# Patient Record
Sex: MALE | Race: WHITE | NOT HISPANIC OR LATINO | Employment: OTHER | URBAN - METROPOLITAN AREA
[De-identification: names, ages, dates, MRNs, and addresses within clinical notes are randomized per-mention and may not be internally consistent; named-entity substitution may affect disease eponyms.]

---

## 2021-03-17 ENCOUNTER — EVALUATION (OUTPATIENT)
Dept: PHYSICAL THERAPY | Facility: REHABILITATION | Age: 66
End: 2021-03-17
Payer: COMMERCIAL

## 2021-03-17 DIAGNOSIS — G20 PARKINSON DISEASE (HCC): Primary | ICD-10-CM

## 2021-03-17 PROCEDURE — 97112 NEUROMUSCULAR REEDUCATION: CPT | Performed by: PHYSICAL THERAPIST

## 2021-03-17 PROCEDURE — 97162 PT EVAL MOD COMPLEX 30 MIN: CPT | Performed by: PHYSICAL THERAPIST

## 2021-03-17 NOTE — LETTER
2021    [address] [id number]    Patient: John Allen   YOB: 1955   Date of Visit: 3/17/2021     Encounter Diagnosis     ICD-10-CM    1  Parkinson disease [facility])  G20        Dear Dr Prema Avendaño: Thank you for your recent referral of John Allen  Please review the attached evaluation summary from John's recent visit  Please verify that you agree with the plan of care by signing the attached order  If you have any questions or concerns, please do not hesitate to call  I sincerely appreciate the opportunity to share in the care of one of your patients and hope to have another opportunity to work with you in the near future  Sincerely,    Sheryas Hayes, PT      Referring Provider:      I certify that I have read the below Plan of Care and certify the need for these services furnished under this plan of treatment while under my care  [address] [id number]  Via Fax: [phone]          PT EVALUATION    Name: John Allen  Date: 21  : 1955  Referring Provider: Gertrudis Byrne  AUTHORIZATION:   Insurance: Payor: Gustavo Spicer REP / Plan: Rohit CALABRESE 1970 [address] Tavon Goldberg REP / Product Type: Medicare PPO /       Have you been experiencing any difficulty when eating or drinking (including coughing)? Yes, sometimes almost choked with fine lettuce of salad  Chews food quick  Are you having difficulty being understood when talking with others? Yes, sometimes wife notes he's too quiet  Are you having any difficulty with memory, thinking, or finding words during a conversation? Yes, sometimes difficulty getting words out        Do you have any changes with your fine motor skills or dexterity resulting in difficulties with buttoning, closing zipper, opening/closing containers, or your abilities to tie your shoes independently? Yes, difficulty with buttoning and shoes  Are you having difficulties managing your tremors in your Ues? Yes  Have you noticed a change in your handwriting? Yes    SUBJECTIVE:  HPI: Landon Smith is a 76 y o  male referred to outpatient physical therapy for the following diagnosis No diagnosis found  Patient reports he was diagnosed with Parkinson's Disease in 2017  He's had episodes of freezing, his balance isn't good at night, and he's tripped up the steps  He is also going to [employer] to work on his strength  His legs felt like a big strength when he was younger, now not as strong  Falls about once per two weeks, tripping mainly, tripping about 2x/day  Some of these imbalances happen with turning  Has lightheaded with initial standing, goes away in a few seconds  Some difficulty getting out of a chair  Sometimes gets stuck when moving, in small areas, sometimes coming around his bed, between the bed and wall  Or sometimes getting up from the corner while holding things in his hand  Some difficulty with speech, slurring and having difficulty with loudness of speech  Some difficulty with getting dressed, tying shoes, sometimes his fingers don't work right  Sometimes hard to reach feet, notes back tightness  Numbness and tingling in the hands, more in the left  Has hearing aides  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[employer], worked on [employer], forklift  Formerly did the [employer] Up and Go program, which was stopped due to community COVID  Patient goals: be more coordinated, have better mobility  No past medical history on file  No current outpatient medications on file  OBJECTIVE:l  Core Movement Tasks Description of task    Sitting Normal   Sitting to Standing Normal   Standing Normal   Walking Forward trunk, mild  Adequate toe clearance bilaterally    Variable amounts of left arm swing, sometimes none, sometimes almost even to the right (normal arm swing on right)  Static Balance    Romberg Normal   Pull Test Normal      Mobility Measures 3/17/21   Assistive device used? None   5 Time Sit to Stand  (17" chair, arms across chest)       10 6 seconds   3 Meter Timed  Up & Go 6 4 seconds    7 7 seconds cognitive    11 1 seconds manual / cognitive      4 time turn (360 degrees to right, left, right, left) 11 6 second    10 8 seconds 2nd trial     Walking speed Not tested   Functional Gait Assessment (see below) 27/30       6 Minute Walk Test (100 foot circular course) 1450 feet  Ending heart rate 96 beats/minute    One episode of pausing due to pain about the left greater than right lumbar region, notes history of bulging disc and injections  Patient-Reported Outcome Measure: Activities-Specific Balance Confidence Scale (ABC Scale) Not tested     Functional Gait Assessment  3/3 Gait level surface  3/3 Change in gait speed  2/3 Gait with horizontal head turns  3/3 Gait with vertical head turns  3/3 Gait and pivot turn  3/3 Step over obstacle  3/3 Gait with narrow base of support  3/3 Gait with eyes closed  2/3 Ambulating backwards  2/3 Steps  27/30 Total score (less than 22/30 indicates increased risk of fall)  Lino Del Toro Upper Extremity Function Right Left     Presence of Tremor Right Upper Extremity Left Upper Extremity    Absent Absent        Muscle Tone--Modified Jose Scale Right Left   Elbow flexors/extensors 0 0   Shoulder external rotators 0 0   Hamstrings 0 0   Gastrocnemius 0 0     Manual Muscle Tests Right Left   Hip flexion 5/5 4/5    Knee extension 5/5 5/5   Dorsiflexion 4/5 5/5     Lacked sensation about right S1/S2  ASSESSMENT:  Marco A Olivia is a 76year old male with history of Parkinson's Disease, recent history of multiple falls  He performed well in mobility tasks, but performance in the clinic doesn't always match how patient is moving at home    There was a cognitive and simultaneous cost to movement, and we can see how freezing can occur with simultaneous or distracting tasks  Recommend physical therapy to establish a robust home exercise program and improve balance/mobility  Further referral needed: Yes, Che Treadwell would benefit from occupational and speech therapy  SHORT-TERM GOALS: 1 month(s)  1  Patient reports at most one fall per month  2  Patient demonstrates 3 meter timed up and go with less than 50% increase in time ("cost") from combined cognitive/manual task (dialing portable phone)  3  Patient walks at least 1600 feet during 6 minute walk test     LONG-TERM GOALS: 3 month(s)  1  Patient reports significant decrease in mobility concerns  2  Patient establishes a home program incorporating at least 3 days of higher intensity exercise, plus two other days of moderate intensity exercise, per week  Precautions - orthostasis    Specialty Treatment Diary  3/17/21 NV    Home exercise program Walk with large amplitude arm swing  Sitting to standing with anterior weight shift     Walking/endurance  Overground walking and treadmill walking with focus on large amplitude arm movement, cognitive tasks        Static and dynamic balance  Boxing, focus on lateral/forwards/backwards movement    Open door with power stance    Walking and turning in small spaces    Strengthening      Stretching              PLAN OF CARE:  Patient will benefit from physical therapy 2 times per week for 1-3 months, tapering in frequency as indicated by progress and ability to complete program without therapist   Neuromuscular re-education, therapeutic exercises, and therapeutic activities as outlined in grids      Heike Medrano, PT  3/17/2021

## 2021-03-17 NOTE — PROGRESS NOTES
PT EVALUATION    Name: Shikha Henriquez  Date: 21  : 1955  Referring Provider: Ofelia Jeffers  AUTHORIZATION:   Insurance: Payor: Usman Queen REP / Plan: Beatrice Benjamin PPO [facility] REP / Product Type: Medicare PPO /       Have you been experiencing any difficulty when eating or drinking (including coughing)? Yes, sometimes almost choked with fine lettuce of salad  Chews food quick  Are you having difficulty being understood when talking with others? Yes, sometimes wife notes he's too quiet  Are you having any difficulty with memory, thinking, or finding words during a conversation? Yes, sometimes difficulty getting words out  Do you have any changes with your fine motor skills or dexterity resulting in difficulties with buttoning, closing zipper, opening/closing containers, or your abilities to tie your shoes independently? Yes, difficulty with buttoning and shoes  Are you having difficulties managing your tremors in your Ues? Yes  Have you noticed a change in your handwriting? Yes    SUBJECTIVE:  HPI: Shikha Henriquez is a 76 y o  male referred to outpatient physical therapy for the following diagnosis No diagnosis found  Patient reports he was diagnosed with Parkinson's Disease in 2017  He's had episodes of freezing, his balance isn't good at night, and he's tripped up the steps  He is also going to [employer] to work on his strength  His legs felt like a big strength when he was younger, now not as strong  Falls about once per two weeks, tripping mainly, tripping about 2x/day  Some of these imbalances happen with turning  Has lightheaded with initial standing, goes away in a few seconds  Some difficulty getting out of a chair  Sometimes gets stuck when moving, in small areas, sometimes coming around his bed, between the bed and wall    Or sometimes getting up from the corner while holding things in his hand  Some difficulty with speech, slurring and having difficulty with loudness of speech  Some difficulty with getting dressed, tying shoes, sometimes his fingers don't work right  Sometimes hard to reach feet, notes back tightness  Numbness and tingling in the hands, more in the left  Has hearing aides  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[employer], worked on [employer], [employer]  Formerly did the [employer] Up and Go program, which was stopped due to community COVID  Patient goals: be more coordinated, have better mobility  No past medical history on file  No current outpatient medications on file  OBJECTIVE:l  Core Movement Tasks Description of task    Sitting Normal   Sitting to Standing Normal   Standing Normal   Walking Forward trunk, mild  Adequate toe clearance bilaterally  Variable amounts of left arm swing, sometimes none, sometimes almost even to the right (normal arm swing on right)  Static Balance    Romberg Normal   Pull Test Normal      Mobility Measures 3/17/21   Assistive device used? None   5 Time Sit to Stand  (17" chair, arms across chest)       10 6 seconds   3 Meter Timed  Up & Go 6 4 seconds    7 7 seconds cognitive    11 1 seconds manual / cognitive      4 time turn (360 degrees to right, left, right, left) 11 6 second    10 8 seconds 2nd trial     Walking speed Not tested   Functional Gait Assessment (see below) 27/30       6 Minute Walk Test (100 foot circular course) 1450 feet  Ending heart rate 96 beats/minute    One episode of pausing due to pain about the left greater than right lumbar region, notes history of bulging disc and injections  Patient-Reported Outcome Measure:  Activities-Specific Balance Confidence Scale (ABC Scale) Not tested     Functional Gait Assessment  3/3 Gait level surface  3/3 Change in gait speed  2/3 Gait with horizontal head turns  3/3 Gait with vertical head turns  3/3 Gait and pivot turn  3/3 Step over obstacle  3/3 Gait with narrow base of support  3/3 Gait with eyes closed  2/3 Ambulating backwards  2/3 Steps  27/30 Total score (less than 22/30 indicates increased risk of fall)  David Fisher Upper Extremity Function Right Left     Presence of Tremor Right Upper Extremity Left Upper Extremity    Absent Absent        Muscle Tone--Modified Jose Scale Right Left   Elbow flexors/extensors 0 0   Shoulder external rotators 0 0   Hamstrings 0 0   Gastrocnemius 0 0     Manual Muscle Tests Right Left   Hip flexion 5/5 4/5    Knee extension 5/5 5/5   Dorsiflexion 4/5 5/5     Lacked sensation about right S1/S2  ASSESSMENT:  Waqas Smalls is a 76year old male with history of Parkinson's Disease, recent history of multiple falls  He performed well in mobility tasks, but performance in the clinic doesn't always match how patient is moving at home  There was a cognitive and simultaneous cost to movement, and we can see how freezing can occur with simultaneous or distracting tasks  Recommend physical therapy to establish a robust home exercise program and improve balance/mobility  Further referral needed: Yes, Waqas Smalls would benefit from occupational and speech therapy  SHORT-TERM GOALS: 1 month(s)  1  Patient reports at most one fall per month  2  Patient demonstrates 3 meter timed up and go with less than 50% increase in time ("cost") from combined cognitive/manual task (dialing portable phone)  3  Patient walks at least 1600 feet during 6 minute walk test     LONG-TERM GOALS: 3 month(s)  1  Patient reports significant decrease in mobility concerns  2  Patient establishes a home program incorporating at least 3 days of higher intensity exercise, plus two other days of moderate intensity exercise, per week        Precautions - orthostasis    Specialty Treatment Diary  3/17/21 NV    Home exercise program Walk with large amplitude arm swing  Sitting to standing with anterior weight shift     Walking/endurance  Overground walking and treadmill walking with focus on large amplitude arm movement, cognitive tasks        Static and dynamic balance  Boxing, focus on lateral/forwards/backwards movement    Open door with power stance    Walking and turning in small spaces    Strengthening      Stretching              PLAN OF CARE:  Patient will benefit from physical therapy 2 times per week for 1-3 months, tapering in frequency as indicated by progress and ability to complete program without therapist   Neuromuscular re-education, therapeutic exercises, and therapeutic activities as outlined in grids      Yonnyma Both, PT  3/17/2021

## 2021-03-19 ENCOUNTER — TRANSCRIBE ORDERS (OUTPATIENT)
Dept: PHYSICAL THERAPY | Facility: REHABILITATION | Age: 66
End: 2021-03-19

## 2021-03-19 DIAGNOSIS — G20 PARKINSON DISEASE (HCC): Primary | ICD-10-CM

## 2021-03-23 ENCOUNTER — OFFICE VISIT (OUTPATIENT)
Dept: PHYSICAL THERAPY | Facility: REHABILITATION | Age: 66
End: 2021-03-23
Payer: COMMERCIAL

## 2021-03-23 DIAGNOSIS — G20 PARKINSON DISEASE (HCC): Primary | ICD-10-CM

## 2021-03-23 PROCEDURE — 97112 NEUROMUSCULAR REEDUCATION: CPT | Performed by: PHYSICAL THERAPIST

## 2021-03-23 NOTE — PROGRESS NOTES
PHYSICAL THERAPY TREATMENT    Name: Rios Sapp  Date: 21  : 1955  Referring Provider: Sebastien Martin  AUTHORIZATION:   Insurance: Payor: Seble Dubose REP / Plan: Speedy Cordoba PPO  W Tavon Goldberg REP / Product Type: Medicare PPO /     SUBJECTIVE:  HPI: Rios Sapp is a 76 y o  male referred to outpatient physical therapy for the following diagnosis   Encounter Diagnosis   Name Primary?  Parkinson disease ([address] ) Yes        Does the SciFit at the gym, increases resistance every minute or so  Patient goals: be more coordinated, have better mobility  OBJECTIVE:        Precautions - orthostasis    Specialty Treatment Diary  3/17/21 3/23/21    Home exercise program Walk with large amplitude arm swing  Sitting to standing with anterior weight shift     Walking/endurance  Overground walking and treadmill walking with focus on large amplitude arm movement, cognitive tasks (conversation), about 20-25 sec per lap, 7 min      Static and dynamic balance  Stepping up 8" step, backwards down, stacking cones (concurrent manual task), 30 sec x  4 sets    In Solo Step, step up and down 8" step,   On balance beam  Over 12" obstacles  Through narrow passageway  5 min total    Open door, power stance, step in, back out, close, repeat, counting backwards from 100 by 7s, x 3 min    Walking and turning 180 deg, 2 min        Strengthening  SciFit intervals 1 min on 1 min off, 15/20 RPE target (achieved with level 4 resistance) 12 min total, 107 bpm max    Stretching                ASSESSMENT:  Ileana Russell is a 76year old male with history of Parkinson's Disease, recent history of multiple falls  Performed well, unable to produce festination today  Recommend that he practice turning in narrow spaces in hallway at home, can narrow hallway with use of chairs  Good understanding of higher intensity interval training  Further referral needed: Yes, Ileana Russell would benefit from occupational and speech therapy      SHORT-TERM GOALS: 1 month(s)  1  Patient reports at most one fall per month  2  Patient demonstrates 3 meter timed up and go with less than 50% increase in time ("cost") from combined cognitive/manual task (dialing portable phone)  3  Patient walks at least 1600 feet during 6 minute walk test     LONG-TERM GOALS: 3 month(s)  1  Patient reports significant decrease in mobility concerns  2  Patient establishes a home program incorporating at least 3 days of higher intensity exercise, plus two other days of moderate intensity exercise, per week  PLAN OF CARE:  Patient will benefit from physical therapy 2 times per week for 1-3 months, tapering in frequency as indicated by progress and ability to complete program without therapist   Neuromuscular re-education, therapeutic exercises, and therapeutic activities as outlined in darshan Ortiz, PT  3/23/2021

## 2021-03-25 ENCOUNTER — OFFICE VISIT (OUTPATIENT)
Dept: PHYSICAL THERAPY | Facility: REHABILITATION | Age: 66
End: 2021-03-25
Payer: COMMERCIAL

## 2021-03-25 DIAGNOSIS — G20 PARKINSON DISEASE (HCC): Primary | ICD-10-CM

## 2021-03-25 PROCEDURE — 97112 NEUROMUSCULAR REEDUCATION: CPT | Performed by: PHYSICAL THERAPIST

## 2021-03-25 NOTE — PROGRESS NOTES
PHYSICAL THERAPY TREATMENT    Name: Noemi Woodard  Date: 21  : 1955  Referring Provider: Roshni Mcginnis  AUTHORIZATION:   Insurance: Payor: Olivia Romeo REP / Plan: Luc Tyson PPO  W Tavon Goldberg REP / Product Type: Medicare PPO /     SUBJECTIVE:  HPI: Noemi Woodard is a 76 y o  male referred to outpatient physical therapy for the following diagnosis   No diagnosis found  Running a little late today, was doing some work on the car  Patient goals: be more coordinated, have better mobility  OBJECTIVE:        Precautions - orthostasis    Specialty Treatment Diary  3/25/21   Home exercise program    Walking/endurance Overground walking with focus on fast pace that produces large arm swing and step lengths, 5 min       Static and dynamic balance Stepping up 8" step, backwards down, stacking cones (concurrent manual task), 30 sec x 3 sets    In Solo Step, step up and down 8" step,   Weaving between obstacles  Stop on airex foam 3 sec eyes closed  Then all with tossing ball (except eyes closed)  8 min total  Then up and backwards down foam incline, 1 min x 2 (2nd set with pausing)  Then onto foam incline, eyes closed 5 sec, backwards off, 1 min x 2 sets    Static stance in SoloStep:  On foam, eyes closed, horizontal head movements, 1 min x 3  Then semi-tandem eyes closed on foam, 1 min x 2  Then AP weight shift, eyes closed on foam, 1 min    Walk while dialing phone, 2 min    Open door, sidestep, walk in, step backwards, close, repeat, concurrent cognitive task, 2 min       Strengthening    Stretching            ASSESSMENT:  Had reported some imbalance and stepping with static balance tasks, so practiced on foam and with eyes closed, then on foam incline  Practiced tasks with concurrent manual and/or cognitive cost, decreased cost today  Continue to push higher intensity activity  Further referral needed: Yes, Aly Vincent would benefit from occupational and speech therapy  SHORT-TERM GOALS: 1 month(s)  1  Patient reports at most one fall per month  2  Patient demonstrates 3 meter timed up and go with less than 50% increase in time ("cost") from combined cognitive/manual task (dialing portable phone)  3  Patient walks at least 1600 feet during 6 minute walk test     LONG-TERM GOALS: 3 month(s)  1  Patient reports significant decrease in mobility concerns  2  Patient establishes a home program incorporating at least 3 days of higher intensity exercise, plus two other days of moderate intensity exercise, per week  PLAN OF CARE:  Patient will benefit from physical therapy 2 times per week for 1-3 months, tapering in frequency as indicated by progress and ability to complete program without therapist   Neuromuscular re-education, therapeutic exercises, and therapeutic activities as outlined in darshan Bautista, PT  3/25/2021

## 2021-03-30 ENCOUNTER — APPOINTMENT (OUTPATIENT)
Dept: PHYSICAL THERAPY | Facility: REHABILITATION | Age: 66
End: 2021-03-30
Payer: COMMERCIAL

## 2021-04-01 ENCOUNTER — OFFICE VISIT (OUTPATIENT)
Dept: PHYSICAL THERAPY | Facility: REHABILITATION | Age: 66
End: 2021-04-01
Payer: COMMERCIAL

## 2021-04-01 DIAGNOSIS — G20 PARKINSON DISEASE (HCC): Primary | ICD-10-CM

## 2021-04-01 PROCEDURE — 97112 NEUROMUSCULAR REEDUCATION: CPT | Performed by: PHYSICAL THERAPIST

## 2021-04-01 NOTE — PROGRESS NOTES
PHYSICAL THERAPY TREATMENT    Name: Merline Fallon  Date: 21  : 1955  Referring Provider: Virgia Skiff  AUTHORIZATION:   Insurance: Payor: Margarette Waggoner REP / Plan: Jossue Canales PPO  W Tavon Goldberg REP / Product Type: Medicare PPO /     SUBJECTIVE:  HPI: Merline Fallon is a 76 y o  male referred to outpatient physical therapy for the following diagnosis   Encounter Diagnosis   Name Primary?  Parkinson disease ([address] ) Yes         Running a little late today, was doing some work on the car  Patient goals: be more coordinated, have better mobility  OBJECTIVE:        Precautions - orthostasis    Specialty Treatment Diary  3/25/21   Home exercise program    Walking/endurance Overground walking with focus on fast pace that produces large arm swing and step lengths, 5 min, about 20-24 sec laps       Static and dynamic balance In SoloStep:   Stepping up 8" step, backwards down, 30 sec x 3 sets  Then laterally 30 sec x 2    Stop on airex foam 3 sec eyes closed, back off, 2 min  Then up and backwards down foam incline, 1 min x 8 (pausing, passing ball)  Same laterally 4 min total  Then sidestep, with crossover and braiding stepping, 5 min         Strengthening    Stretching            ASSESSMENT:  Tadeo Long did very well with mobility today, very challenging to promote imbalance, even with concurrent tasks, backwards walking and stepping, and fast stopping  Continue to challenge  Continue to incorporate higher intensity exercise at home  Further referral needed: Yes, Tadeo Long would benefit from occupational and speech therapy  SHORT-TERM GOALS: 1 month(s)  1  Patient reports at most one fall per month  2  Patient demonstrates 3 meter timed up and go with less than 50% increase in time ("cost") from combined cognitive/manual task (dialing portable phone)  3  Patient walks at least 1600 feet during 6 minute walk test     LONG-TERM GOALS: 3 month(s)  1  Patient reports significant decrease in mobility concerns  2  Patient establishes a home program incorporating at least 3 days of higher intensity exercise, plus two other days of moderate intensity exercise, per week  PLAN OF CARE:  Patient will benefit from physical therapy 2 times per week for 1-3 months, tapering in frequency as indicated by progress and ability to complete program without therapist   Neuromuscular re-education, therapeutic exercises, and therapeutic activities as outlined in darshan Villagomez, PT  4/1/2021

## 2021-04-06 ENCOUNTER — OFFICE VISIT (OUTPATIENT)
Dept: PHYSICAL THERAPY | Facility: REHABILITATION | Age: 66
End: 2021-04-06
Payer: COMMERCIAL

## 2021-04-06 DIAGNOSIS — G20 PARKINSON DISEASE (HCC): Primary | ICD-10-CM

## 2021-04-06 PROCEDURE — 97112 NEUROMUSCULAR REEDUCATION: CPT | Performed by: PHYSICAL THERAPIST

## 2021-04-06 NOTE — PROGRESS NOTES
PHYSICAL THERAPY TREATMENT    Name: Viva Frankel  Date: 21  : 1955  Referring Provider: Perrin Prader  AUTHORIZATION:   Insurance: Payor: Carolyn Salas REP / Plan: Amon Goodpasture PPO [facility] REP / Product Type: Medicare PPO /     SUBJECTIVE:  HPI: Viva Frankel is a 76 y o  male referred to outpatient physical therapy for the following diagnosis   Encounter Diagnosis   Name Primary?  Parkinson disease ([address] ) Yes       Did a lot of work over the weekend, walking hills too  Will help friend stock pond with trout, did some trout fishing over the weekend  Patient goals: be more coordinated, have better mobility  OBJECTIVE:        Precautions - orthostasis    Specialty Treatment Diary  21   Home exercise program    Walking/endurance Overground walking with focus on fast pace that produces large arm swing and step lengths, stepping over 6" obstacles with one step per obstacle, 5 min           Static and dynamic balance In SoloStep, over [employer] treadmill, no use of hands:  1 9 mph baseline 3 min  Then up 8% incline, 1 min x 2  Then 2 4 mph 1 min x 1  Backwards 0 9 mph 1 min    Then 1 9 mph 2 min  Then while dialing phone, 1 min x 3  Then backwards 0 9 mph x 2, then dialing phone backwards 1 min  Then sidestep 0 4 mph 1 min each    Boxing working on cross body punching, backwards step in response to therapist movement, 30 sec on 15 sec rest x 6 sets       Strengthening    Stretching          ASSESSMENT:  Shared that he's had intermittent palpitations, had normal treadmill stress test a couple years ago  /73, 90 bpm upon rest   No symptoms with exertion  Continue to challenge multitasking and multidirection movement  Further referral needed: Yes, Deniz Keita would benefit from occupational and speech therapy  SHORT-TERM GOALS: 1 month(s)  1  Patient reports at most one fall per month    2  Patient demonstrates 3 meter timed up and go with less than 50% increase in time ("cost") from combined cognitive/manual task (dialing portable phone)  3  Patient walks at least 1600 feet during 6 minute walk test     LONG-TERM GOALS: 3 month(s)  1  Patient reports significant decrease in mobility concerns  2  Patient establishes a home program incorporating at least 3 days of higher intensity exercise, plus two other days of moderate intensity exercise, per week  PLAN OF CARE:  Patient will benefit from physical therapy 2 times per week for 1-3 months, tapering in frequency as indicated by progress and ability to complete program without therapist   Neuromuscular re-education, therapeutic exercises, and therapeutic activities as outlined in grids      Oz Rueda, PT  4/6/2021

## 2021-04-08 ENCOUNTER — OFFICE VISIT (OUTPATIENT)
Dept: PHYSICAL THERAPY | Facility: REHABILITATION | Age: 66
End: 2021-04-08
Payer: COMMERCIAL

## 2021-04-08 DIAGNOSIS — G20 PARKINSON DISEASE (HCC): Primary | ICD-10-CM

## 2021-04-08 PROCEDURE — 97112 NEUROMUSCULAR REEDUCATION: CPT | Performed by: PHYSICAL THERAPIST

## 2021-04-08 NOTE — PROGRESS NOTES
PHYSICAL THERAPY TREATMENT    Name: Lilibeth Rosa  Date: 21  : 1955  Referring Provider: Zeina Cohn  AUTHORIZATION:   Insurance: Payor: Ranjith Gonzalez REP / Plan: Oksana Munoz PPO [facility] REP / Product Type: Medicare PPO /     SUBJECTIVE:  HPI: Lilibeth Rosa is a 76 y o  male referred to outpatient physical therapy for the following diagnosis   Encounter Diagnosis   Name Primary?  Parkinson disease ([address] ) Yes       Stocking fish yesterday up by [facility]  Right hamstring tightness yesterday, not today  Episode of freezing last night, in basement, needed to turn or back up  Grabbed on to table, got his feet moving again  [place], felt his feet start to slide, had to get to hands and knees, pulled himself out that way  Happens 2-3x/week generally  Last week was in toolshed, something was in the way, froze with feet apart, able to back up and get out  Patient goals: be more coordinated, have better mobility  OBJECTIVE:        Precautions - orthostasis    Specialty Treatment Diary  21   Home exercise program    Walking/endurance Overground walking with focus on fast pace that produces large arm swing and step lengths, stepping over 6" obstacles with one step per obstacle, 5 min           Static and dynamic balance In SoloStep, over [employer] treadmill, no use of hands:  2 2 mph - 2 8 mph 5 min  Backwards 1 2 mph 2 min  Sideways 0 6-0 7 mph 2 min to right and 2 min to left  Then repeated while tossing and catching ball  2 2 mph - 2 8 mph 5 min  Backwards 1 2 mph 2 min  Sideways 0 6-0 7 mph 2 min to right and 2 min to left    Boxing working on cross body punching, backwards step in response to therapist movement, 30 sec on 15 sec rest x 6 sets       Strengthening    Stretching          ASSESSMENT:  Minimal decline with concurrent conversation or catching task  We reviewed situations where he'd frozen, besides change of direction, no pattern to this    Continue to challenge with higher intensity exercise  Further referral needed: Yes, Oliverio Lose would benefit from occupational and speech therapy  SHORT-TERM GOALS: 1 month(s)  1  Patient reports at most one fall per month  2  Patient demonstrates 3 meter timed up and go with less than 50% increase in time ("cost") from combined cognitive/manual task (dialing portable phone)  3  Patient walks at least 1600 feet during 6 minute walk test     LONG-TERM GOALS: 3 month(s)  1  Patient reports significant decrease in mobility concerns  2  Patient establishes a home program incorporating at least 3 days of higher intensity exercise, plus two other days of moderate intensity exercise, per week  PLAN OF CARE:  Patient will benefit from physical therapy 2 times per week for 1-3 months, tapering in frequency as indicated by progress and ability to complete program without therapist   Neuromuscular re-education, therapeutic exercises, and therapeutic activities as outlined in grids      Reji Pierce, PT  4/8/2021

## 2021-04-13 ENCOUNTER — APPOINTMENT (OUTPATIENT)
Dept: PHYSICAL THERAPY | Facility: REHABILITATION | Age: 66
End: 2021-04-13
Payer: COMMERCIAL

## 2021-04-15 ENCOUNTER — EVALUATION (OUTPATIENT)
Dept: PHYSICAL THERAPY | Facility: REHABILITATION | Age: 66
End: 2021-04-15
Payer: COMMERCIAL

## 2021-04-15 ENCOUNTER — TRANSCRIBE ORDERS (OUTPATIENT)
Dept: PHYSICAL THERAPY | Facility: REHABILITATION | Age: 66
End: 2021-04-15

## 2021-04-15 DIAGNOSIS — D68.52 HETEROZYGOUS FOR PROTHROMBIN G20210A MUTATION (HCC): Primary | ICD-10-CM

## 2021-04-15 DIAGNOSIS — G20 PARKINSON DISEASE (HCC): Primary | ICD-10-CM

## 2021-04-15 PROCEDURE — 97112 NEUROMUSCULAR REEDUCATION: CPT | Performed by: PHYSICAL THERAPIST

## 2021-04-15 NOTE — LETTER
April 15, 2021    [facility] [employer] [id number]    Patient: Ancel Schwab   YOB: 1955   Date of Visit: 4/15/2021     Encounter Diagnosis     ICD-10-CM    1  Parkinson disease [facility])  G20        Dear Dr Nolberto Morgan: Thank you for your recent referral of Ancel Schwab  Please review the attached evaluation summary from John's recent visit  Please verify that you agree with the plan of care by signing the attached order  If you have any questions or concerns, please do not hesitate to call  I sincerely appreciate the opportunity to share in the care of one of your patients and hope to have another opportunity to work with you in the near future  Sincerely,    Alexi Pedroza PT      Referring Provider:      I certify that I have read the below Plan of Care and certify the need for these services furnished under this plan of treatment while under my care  [facility] [employer] [id number]  Via Fax: [phone]          PHYSICAL THERAPY TREATMENT    Name: Ancel Schwab  Date: 04/15/21  : 1955  Referring Provider: Saint Sheng  AUTHORIZATION:   Insurance: Payor: Dewayne Swenson REP / Plan: Gavi CALABRESE [facility] REP / Product Type: Medicare PPO /     SUBJECTIVE:  HPI: Ancel Schwab is a 76 y o  male referred to outpatient physical therapy for the following diagnosis   Encounter Diagnosis   Name Primary?  Parkinson disease ([address] ) Yes       4/15/21:  Was getting fish out of the water and feet got stuck in [place], had difficulty lifting feet from the water  The other day slipped over a root  This week no freezing or loss of balance at home  Around 80% with mobility, main concern is balance  Problem of rigidity with initial moving in the morning  Going to gym regularly, working hard on recumbent stepper among     21  Stocking fish yesterday up by [facility]  Right hamstring tightness yesterday, not today  Episode of freezing last night, in basement, needed to turn or back up  Grabbed on to table, got his feet moving again  Also happened [place] the creek, felt his feet start to slide, had to get to hands and knees, pulled himself out that way  Happens 2-3x/week generally  Last week was in toolshed, something was in the way, froze with feet apart, able to back up and get out  Patient goals: be more coordinated, have better mobility  OBJECTIVE:        Precautions - orthostasis    Specialty Treatment Diary  4/15/21   Home exercise program    Walking/endurance    Static and dynamic balance Step ups as fast as possible:   8" step forwards 1 min x 2 sets  Laterally 8" step 1 min x 2   Strengthening    Stretching Standing hip flexor stretch 1 min x 2 each side             Mobility Measures 3/17/21 4/15/21   Assistive device used? None    5 Time Sit to Stand  (17" chair, arms across chest)       10 6 seconds 7 1 seconds   3 Meter Timed  Up & Go 6 4 seconds    7 7 seconds cognitive    11 1 seconds manual / cognitive    5 8 seconds    5 8 seconds cognitive     6 7 manual / cognitive   4 time turn (360 degrees to right, left, right, left) 11 6 second    10 8 seconds 2nd trial   9 6 seconds    8 7 seconds   Walking speed Not tested    Functional Gait Assessment (see below) 27/30 29/30        6 Minute Walk Test (100 foot circular course) 1450 feet  Ending heart rate 96 beats/minute    One episode of pausing due to pain about the left greater than right lumbar region, notes history of bulging disc and injections  2050 feet    Just started hurting above the left hip at minute l   Patient-Reported Outcome Measure:  Activities-Specific Balance Confidence Scale (ABC Scale) Not tested Not tested     Functional Gait Assessment  3/3 Gait level surface  3/3 Change in gait speed  3/3 Gait with horizontal head turns  3/3 Gait with vertical head turns  3/3 Gait and pivot turn  3/3 Step over obstacle  2/3 Gait with narrow base of support  3/3 Gait with eyes closed  3/3 Ambulating backwards  3/3 Steps  29/30 Total score (less than 22/30 indicates increased risk of fall)       ASSESSMENT:  Good improvement in mobility over the past month, especially in 6 minute walk test and Functional Gait Assessment  And good level of exertion outside of therapy  Based on good improvements and carryover, we will follow-up in 6 weeks  Further referral needed: Yes, Per Cortes would benefit from occupational and speech therapy  SHORT-TERM GOALS: 1 month(s)  1  Patient reports at most one fall per month  NOT MET but with some increased balance challenge situations  2  Patient demonstrates 3 meter timed up and go with less than 50% increase in time ("cost") from combined cognitive/manual task (dialing portable phone)  MET  3  Patient walks at least 1600 feet during 6 minute walk test   MET, SURPASSED  LONG-TERM GOALS: 3 month(s)  1  Patient reports significant decrease in mobility concerns  GOOD PROGRESS  2  Patient establishes a home program incorporating at least 3 days of higher intensity exercise, plus two other days of moderate intensity exercise, per week  MET  PLAN OF CARE:  Patient will benefit from physical therapy tapering in frequency as indicated by progress and ability to complete program without therapist   Neuromuscular re-education, therapeutic exercises, and therapeutic activities as outlined in grids      Janay [place], PT  4/15/2021

## 2021-04-15 NOTE — PROGRESS NOTES
PHYSICAL THERAPY TREATMENT    Name: Marcie Kaur  Date: 04/15/21  : 1955  Referring Provider: Yu Brandon  AUTHORIZATION:   Insurance: Payor: Vicky Johnson REP / Plan: Annie CALABRESE [address] REP / Product Type: Medicare PPO /     SUBJECTIVE:  HPI: Marcie Kaur is a 76 y o  male referred to outpatient physical therapy for the following diagnosis   Encounter Diagnosis   Name Primary?  Parkinson disease ([address] ) Yes       4/15/21:  Was getting fish out of the water and feet got stuck in [place], had difficulty lifting feet from the water  The other day slipped over a root  This week no freezing or loss of balance at home  Around 80% with mobility, main concern is balance  Problem of rigidity with initial moving in the morning  Going to gym regularly, working hard on recumbent stepper among     21  Stocking fish yesterday up by [facility]  Right hamstring tightness yesterday, not today  Episode of freezing last night, in basement, needed to turn or back up  Grabbed on to table, got his feet moving again  Also happened [place] the creek, felt his feet start to slide, had to get to hands and knees, pulled himself out that way  Happens 2-3x/week generally  Last week was in toolshed, something was in the way, froze with feet apart, able to back up and get out  Patient goals: be more coordinated, have better mobility  OBJECTIVE:        Precautions - orthostasis    Specialty Treatment Diary  4/15/21   Home exercise program    Walking/endurance    Static and dynamic balance Step ups as fast as possible:   8" step forwards 1 min x 2 sets  Laterally 8" step 1 min x 2   Strengthening    Stretching Standing hip flexor stretch 1 min x 2 each side             Mobility Measures 3/17/21 4/15/21   Assistive device used?  None    5 Time Sit to Stand  (17" chair, arms across chest)       10 6 seconds 7 1 seconds   3 Meter Timed  Up & Go 6 4 seconds    7 7 seconds cognitive    11 1 seconds manual / cognitive    5 8 seconds    5 8 seconds cognitive     6 7 manual / cognitive   4 time turn (360 degrees to right, left, right, left) 11 6 second    10 8 seconds 2nd trial   9 6 seconds    8 7 seconds   Walking speed Not tested    Functional Gait Assessment (see below) 27/30 29/30        6 Minute Walk Test (100 foot circular course) 1450 feet  Ending heart rate 96 beats/minute    One episode of pausing due to pain about the left greater than right lumbar region, notes history of bulging disc and injections  2050 feet    Just started hurting above the left hip at minute l   Patient-Reported Outcome Measure: Activities-Specific Balance Confidence Scale (ABC Scale) Not tested Not tested     Functional Gait Assessment  3/3 Gait level surface  3/3 Change in gait speed  3/3 Gait with horizontal head turns  3/3 Gait with vertical head turns  3/3 Gait and pivot turn  3/3 Step over obstacle  2/3 Gait with narrow base of support  3/3 Gait with eyes closed  3/3 Ambulating backwards  3/3 Steps  29/30 Total score (less than 22/30 indicates increased risk of fall)       ASSESSMENT:  Good improvement in mobility over the past month, especially in 6 minute walk test and Functional Gait Assessment  And good level of exertion outside of therapy  Based on good improvements and carryover, we will follow-up in 6 weeks  Further referral needed: Yes, Romario Forbes would benefit from occupational and speech therapy  SHORT-TERM GOALS: 1 month(s)  1  Patient reports at most one fall per month  NOT MET but with some increased balance challenge situations  2  Patient demonstrates 3 meter timed up and go with less than 50% increase in time ("cost") from combined cognitive/manual task (dialing portable phone)  MET  3  Patient walks at least 1600 feet during 6 minute walk test   MET, SURPASSED  LONG-TERM GOALS: 3 month(s)  1  Patient reports significant decrease in mobility concerns  GOOD PROGRESS  2   Patient establishes a home program incorporating at least 3 days of higher intensity exercise, plus two other days of moderate intensity exercise, per week  MET  PLAN OF CARE:  Patient will benefit from physical therapy tapering in frequency as indicated by progress and ability to complete program without therapist   Neuromuscular re-education, therapeutic exercises, and therapeutic activities as outlined in darshan Carranza, PT  4/15/2021

## 2021-06-02 ENCOUNTER — EVALUATION (OUTPATIENT)
Dept: PHYSICAL THERAPY | Facility: REHABILITATION | Age: 66
End: 2021-06-02
Payer: COMMERCIAL

## 2021-06-02 ENCOUNTER — TRANSCRIBE ORDERS (OUTPATIENT)
Dept: PHYSICAL THERAPY | Facility: REHABILITATION | Age: 66
End: 2021-06-02

## 2021-06-02 DIAGNOSIS — G20 PARKINSON DISEASE (HCC): Primary | ICD-10-CM

## 2021-06-02 PROCEDURE — 97112 NEUROMUSCULAR REEDUCATION: CPT | Performed by: PHYSICAL THERAPIST

## 2021-06-02 PROCEDURE — 97110 THERAPEUTIC EXERCISES: CPT | Performed by: PHYSICAL THERAPIST

## 2021-06-02 NOTE — PROGRESS NOTES
PHYSICAL THERAPY TREATMENT    Name: Mauricio Lindo  Date: 21  : 1955  Referring Provider: Maximo Lopes  AUTHORIZATION:   Insurance: Payor: Allison Humphrey REP / Plan: Virgen Miller PPO  W Tavon Goldberg REP / Product Type: Medicare PPO /     SUBJECTIVE:  HPI: Mauricio Lindo is a 78 y o  male referred to outpatient physical therapy for the following diagnosis   Encounter Diagnosis   Name Primary?  Parkinson disease ([address] ) Yes      21  Has been going to the gym to work on leg strength, does up to 3000 steps on the [employer], does leg lifts fir thighs and calves, knee extension, trunk twists, does hydromassage to get things loosened up  Tries to do this 4 times per week  Friend and himself fell into river when fishing  Was reaching forwards for a fish  No tripping falls  At home had some falls from tripping, catching foot on stairs when going up quickly  Wants to have back checked  Had steroid shot 4 years ago  Felt some back pain if doing a lot of bending over  Doing a lot of manual work around [employer] yard  Occurs across lumbar region, sometimes to the right hip socket  Patient goals: be more coordinated, have better mobility  OBJECTIVE:        Mobility Measures 3/17/21 4/15/21 6/02/21   Assistive device used?  None     5 Time Sit to Stand  (17" chair, arms across chest)       10 6 seconds 7 1 seconds 8 3 seconds   3 Meter Timed  Up & Go 6 4 seconds    7 7 seconds cognitive    11 1 seconds manual / cognitive    5 8 seconds    5 8 seconds cognitive     6 7 manual / cognitive 5 4 seconds     6 0 cognitive    8 3 manual/cognitive   4 time turn (360 degrees to right, left, right, left) 11 6 second    10 8 seconds 2nd trial   9 6 seconds    8 7 seconds 8 9 seconds    8 4 seconds   Walking speed Not tested     Functional Gait Assessment (see below)          6 Minute Walk Test (100 foot circular course) 1450 feet  Ending heart rate 96 beats/minute    One episode of pausing due to pain about the left greater than right lumbar region, notes history of bulging disc and injections  2050 feet    Just started hurting above the left hip at minute l 2060 feet       Patient-Reported Outcome Measure: Activities-Specific Balance Confidence Scale (ABC Scale) Not tested Not tested Not tested     Functional Gait Assessment  3/3 Gait level surface  3/3 Change in gait speed  2/3 Gait with horizontal head turns  3/3 Gait with vertical head turns  3/3 Gait and pivot turn  3/3 Step over obstacle  3/3 Gait with narrow base of support  3/3 Gait with eyes closed  3/3 Ambulating backwards  2/3 Steps  28/30 Total score (less than 22/30 indicates increased risk of fall)       Mild to moderate hip tightness with hamstrings, hip IR, hip ER, abduction  Tightness about lumbar region with 5/5 lower abdominal strength position, denies pain with hooklying bilateral bent knee raise  Added bilateral bent knee raise in hooklying 1 25 min to HEP  Also added supine hip flexor stretches, 1 min x 2 each  Scapular row, 100-115 lbs 12 x 2 sets  Standing place 11 lbs to stomach height and extend, keep trunk stable, 10 x 2 sets    ASSESSMENT:  Anusha Ward was able to maintain improvements in mobility over the past 1 5 months  He scores well with his objective measures of mobility  He does have some stiffness in the early morning and with medication wearing-off times  Recommend discussing with neurologist at next visit if there is any further refining of medication to keep mobility fluid throughout the day  On the movement side of things, Anusha Ward is maintaining a good level of physical activity and is doing great with his level of exercise  Due to report of back pain with prolonged standing/stooping/lifting tasks, we addressed this with a greater focus on lumbar stabilization, which will be incorporated in gym-based routine, and hip flexion stretching    He can follow-up with orthopedics or physical therapy if no improvement in a month or so  Discharge from physical therapy at this time with good understanding of physical activity and exercise  Resume if we see any decline  SHORT-TERM GOALS: 1 month(s)  1  Patient reports at most one fall per month  NOT MET   2  Patient demonstrates 3 meter timed up and go with less than 50% increase in time ("cost") from combined cognitive/manual task (dialing portable phone)  MET  3  Patient walks at least 1600 feet during 6 minute walk test   MET    LONG-TERM GOALS: 3 month(s)  1  Patient reports significant decrease in mobility concerns  MET  2  Patient establishes a home program incorporating at least 3 days of higher intensity exercise, plus two other days of moderate intensity exercise, per week  MET  PLAN OF CARE:  Discharge from physical therapy      Yosi Zheng, PT  6/2/2021

## 2021-06-02 NOTE — LETTER
2021    [employer] [id number]    Patient: Broderick Jones   YOB: 1955   Date of Visit: 2021     Encounter Diagnosis     ICD-10-CM    1  Parkinson disease [facility])  G20        Dear Dr Desai Pitch: Thank you for your recent referral of Broderick Jones  Please review the attached evaluation summary from John's recent visit  Please verify that you agree with the plan of care by signing the attached order  If you have any questions or concerns, please do not hesitate to call  I sincerely appreciate the opportunity to share in the care of one of your patients and hope to have another opportunity to work with you in the near future  Sincerely,    Reji Pierce, PT      Referring Provider:      I certify that I have read the below Plan of Care and certify the need for these services furnished under this plan of treatment while under my care  [employer] [id number]  Via Fax: [phone]          PHYSICAL THERAPY TREATMENT    Name: Broderick Jones  Date: 21  : 1955  Referring Provider: Migel Arce  AUTHORIZATION:   Insurance: Payor: Kisha Robertson REP / Plan: Castillo Malcolm PPITZEL [facility] REP / Product Type: Medicare PPO /     SUBJECTIVE:  HPI: Broderick Jones is a 78 y o  male referred to outpatient physical therapy for the following diagnosis   Encounter Diagnosis   Name Primary?  Parkinson disease ([facility] [address] ) Yes      21  Has been going to the gym to work on leg strength, does up to 3000 steps on the [employer], does leg lifts fir thighs and calves, knee extension, trunk twists, does hydromassage to get things loosened up  Tries to do this 4 times per week  Friend and himself fell into river when fishing  Was reaching forwards for a fish  No tripping falls  At home had some falls from tripping, catching foot on stairs when going up quickly  Wants to have back checked  Had steroid shot 4 years ago  Felt some back pain if doing a lot of bending over  Doing a lot of manual work around [employer] yard  Occurs across lumbar region, sometimes to the right hip socket  Patient goals: be more coordinated, have better mobility  OBJECTIVE:        Mobility Measures 3/17/21 4/15/21 6/02/21   Assistive device used? None     5 Time Sit to Stand  (17" chair, arms across chest)       10 6 seconds 7 1 seconds 8 3 seconds   3 Meter Timed  Up & Go 6 4 seconds    7 7 seconds cognitive    11 1 seconds manual / cognitive    5 8 seconds    5 8 seconds cognitive     6 7 manual / cognitive 5 4 seconds     6 0 cognitive    8 3 manual/cognitive   4 time turn (360 degrees to right, left, right, left) 11 6 second    10 8 seconds 2nd trial   9 6 seconds    8 7 seconds 8 9 seconds    8 4 seconds   Walking speed Not tested     Functional Gait Assessment (see below) 27/30 29/30 28/30         6 Minute Walk Test (100 foot circular course) 1450 feet  Ending heart rate 96 beats/minute    One episode of pausing due to pain about the left greater than right lumbar region, notes history of bulging disc and injections  2050 feet    Just started hurting above the left hip at minute l 2060 feet       Patient-Reported Outcome Measure: Activities-Specific Balance Confidence Scale (ABC Scale) Not tested Not tested Not tested     Functional Gait Assessment  3/3 Gait level surface  3/3 Change in gait speed  2/3 Gait with horizontal head turns  3/3 Gait with vertical head turns  3/3 Gait and pivot turn  3/3 Step over obstacle  3/3 Gait with narrow base of support  3/3 Gait with eyes closed  3/3 Ambulating backwards  2/3 Steps  28/30 Total score (less than 22/30 indicates increased risk of fall)       Mild to moderate hip tightness with hamstrings, hip IR, hip ER, abduction    Tightness about lumbar region with 5/5 lower abdominal strength position, denies pain with hooklying bilateral bent knee raise  Added bilateral bent knee raise in hooklying 1 25 min to HEP  Also added supine hip flexor stretches, 1 min x 2 each  Scapular row, 100-115 lbs 12 x 2 sets  Standing place 11 lbs to stomach height and extend, keep trunk stable, 10 x 2 sets    ASSESSMENT:  Ashanti Quintero was able to maintain improvements in mobility over the past 1 5 months  He scores well with his objective measures of mobility  He does have some stiffness in the early morning and with medication wearing-off times  Recommend discussing with neurologist at next visit if there is any further refining of medication to keep mobility fluid throughout the day  On the movement side of things, Ashanti Quintero is maintaining a good level of physical activity and is doing great with his level of exercise  Due to report of back pain with prolonged standing/stooping/lifting tasks, we addressed this with a greater focus on lumbar stabilization, which will be incorporated in gym-based routine, and hip flexion stretching  He can follow-up with orthopedics or physical therapy if no improvement in a month or so  Discharge from physical therapy at this time with good understanding of physical activity and exercise  Resume if we see any decline  SHORT-TERM GOALS: 1 month(s)  1  Patient reports at most one fall per month  NOT MET   2  Patient demonstrates 3 meter timed up and go with less than 50% increase in time ("cost") from combined cognitive/manual task (dialing portable phone)  MET  3  Patient walks at least 1600 feet during 6 minute walk test   MET    LONG-TERM GOALS: 3 month(s)  1  Patient reports significant decrease in mobility concerns  MET  2  Patient establishes a home program incorporating at least 3 days of higher intensity exercise, plus two other days of moderate intensity exercise, per week  MET  PLAN OF CARE:  Discharge from physical therapy      Oz Rueda, PT  6/2/2021